# Patient Record
Sex: FEMALE | Race: WHITE | NOT HISPANIC OR LATINO | URBAN - METROPOLITAN AREA
[De-identification: names, ages, dates, MRNs, and addresses within clinical notes are randomized per-mention and may not be internally consistent; named-entity substitution may affect disease eponyms.]

---

## 2017-08-24 ENCOUNTER — EMERGENCY (EMERGENCY)
Facility: HOSPITAL | Age: 27
LOS: 1 days | Discharge: HOME | End: 2017-08-24
Attending: EMERGENCY MEDICINE | Admitting: EMERGENCY MEDICINE
Payer: COMMERCIAL

## 2017-08-24 VITALS
DIASTOLIC BLOOD PRESSURE: 80 MMHG | OXYGEN SATURATION: 99 % | HEART RATE: 70 BPM | TEMPERATURE: 98 F | RESPIRATION RATE: 16 BRPM | SYSTOLIC BLOOD PRESSURE: 119 MMHG | HEIGHT: 61 IN | WEIGHT: 113.98 LBS

## 2017-08-24 LAB — HCG UR QL: NEGATIVE — SIGNIFICANT CHANGE UP

## 2017-08-24 PROCEDURE — 99283 EMERGENCY DEPT VISIT LOW MDM: CPT

## 2017-08-24 PROCEDURE — 81025 URINE PREGNANCY TEST: CPT

## 2017-08-24 NOTE — ED STATDOCS - CARE PLAN
Principal Discharge DX:	Needle stick injury of finger of left hand, initial encounter  Instructions for follow-up, activity and diet:	Follow-up with employee health today

## 2017-08-24 NOTE — ED STATDOCS - PROGRESS NOTE DETAILS
NP NOTE: Pt seen by intake physician and HPI/ROS/PE/MDM reviewed. Pt seen and evaluated. Discussed plan and any resulted studies at this time. Will continue to monitor and re-evaluate.  Re-Evaluation: needle stick packet completed. pending source patient results. low risk stick, pt deciding to wait for results PEP. Source patient testing in progress.  Lengthy d/w patient regarding PEP.  Low risk exposure: IO needle, into finger, patient with no historical risk factors for HIV.

## 2017-08-24 NOTE — ED STATDOCS - SKIN, MLM
Barely perceivable puncture to radial aspect of second middle phalanx on right index finger Barely perceivable puncture to radial aspect of second middle phalanx on left index finger

## 2017-08-24 NOTE — ED STATDOCS - ATTENDING CONTRIBUTION TO CARE
I, Jimenez Jeffrey, performed the initial face to face bedside interview with this patient regarding history of present illness, review of symptoms and relevant past medical, social and family history.  I completed an independent physical examination.  I was the provider who initially evaluated this patient.  The history, relevant review of systems, past medical and surgical history, medical decision making, and physical examination was documented by the scribe in my presence and I attest to the accuracy of the documentation. Follow-up on ordered tests (ie labs, radiologic studies) and re-evaluation of the patient's status has been communicated to the ACP.  Disposition of the patient will be based on test outcome and response to ED interventions.

## 2017-08-24 NOTE — ED STATDOCS - OBJECTIVE STATEMENT
26 y/o F pt presents to ED c/o needle stick to right index finger from ED patient PTA. Pt was wearing gloves. Pt denies fever, chills, CP, SOB, nausea, vomiting, diarrhea, abd pain, dysuria, hematuria, urinary frequency/urgency/hesitancy, back pain, headache, and dizziness. No further complaints at this time. NKDA. 26 y/o F pt presents to ED c/o needle stick to left index finger from ED patient PTA. Pt was wearing gloves at time of injury.  patient's imm UTD.  Source patient known: no high risk factors for HVI/ HEP.   No further complaints at this time. NKDA.

## 2017-08-24 NOTE — ED ADULT NURSE NOTE - OBJECTIVE STATEMENT
pt alert and awake x3, employee, states she was removing an IO needle from another pt and stuck herself with needle point, pt states it was left index finger, no obvious signs of bleeding, denies chest pain/sob, LS clear, will continue to monitor.

## 2019-06-13 PROBLEM — Z00.00 ENCOUNTER FOR PREVENTIVE HEALTH EXAMINATION: Status: ACTIVE | Noted: 2019-06-13

## 2019-06-18 ENCOUNTER — APPOINTMENT (OUTPATIENT)
Dept: ORTHOPEDIC SURGERY | Facility: CLINIC | Age: 29
End: 2019-06-18
Payer: COMMERCIAL

## 2019-06-18 VITALS
HEIGHT: 61 IN | SYSTOLIC BLOOD PRESSURE: 133 MMHG | HEART RATE: 75 BPM | BODY MASS INDEX: 22.66 KG/M2 | WEIGHT: 120 LBS | DIASTOLIC BLOOD PRESSURE: 83 MMHG

## 2019-06-18 DIAGNOSIS — M47.816 SPONDYLOSIS W/OUT MYELOPATHY OR RADICULOPATHY, LUMBAR REGION: ICD-10-CM

## 2019-06-18 DIAGNOSIS — Z78.9 OTHER SPECIFIED HEALTH STATUS: ICD-10-CM

## 2019-06-18 DIAGNOSIS — Z80.1 FAMILY HISTORY OF MALIGNANT NEOPLASM OF TRACHEA, BRONCHUS AND LUNG: ICD-10-CM

## 2019-06-18 DIAGNOSIS — Z80.0 FAMILY HISTORY OF MALIGNANT NEOPLASM OF DIGESTIVE ORGANS: ICD-10-CM

## 2019-06-18 PROCEDURE — 99204 OFFICE O/P NEW MOD 45 MIN: CPT

## 2019-06-18 PROCEDURE — 72100 X-RAY EXAM L-S SPINE 2/3 VWS: CPT

## 2019-06-18 RX ORDER — METHYLPREDNISOLONE 4 MG/1
4 TABLET ORAL
Qty: 1 | Refills: 0 | Status: ACTIVE | COMMUNITY
Start: 2019-06-18 | End: 1900-01-01

## 2019-06-18 RX ORDER — KETOROLAC TROMETHAMINE 10 MG/1
10 TABLET, FILM COATED ORAL 3 TIMES DAILY
Qty: 15 | Refills: 0 | Status: ACTIVE | COMMUNITY
Start: 2019-06-18 | End: 1900-01-01

## 2019-06-18 NOTE — PROCEDURE
[de-identified] : Verbal consent was obtained and all questions, comments and concerns were addressed. Right arm was cleansed with alcohol prep X 3, ethyl chloride was used as local anesthetic. Under sterile precautions 30mg of Ketorolac and 80mg Depo-Medrol were instilled in to the right buttock under sterile precautions. she tolerated procedure well. Dry sterile dressing was placed and patient described relief of pain 5 minutes after procedure was performed.

## 2019-06-18 NOTE — DISCUSSION/SUMMARY
[de-identified] : I have recommended that the pt continue with a conservative treatment plan. Pt tolerated the injection given in office today. I think a large portion of this patient's low back pain is due to her  baby / increase in activity. Pt will be referred to pain management for epidural injections. We also spoke about the benefits of using heat, ice, and Bengay cream. I will prescribe a Medrol Dose Pack and oral Toradol to provide pain relief. Pt can use anti-inflammatories PRN pain. Pt was instructed to start home exercises, core strengthening and a sheet was provided. We also spoke about the benefits to chiropractic and medical massage. The pt may follow up here in 2 months for repeat clinical assessment.

## 2019-06-18 NOTE — PHYSICAL EXAM
[Acute Distress] : not in acute distress [Poor Appearance] : well-appearing [de-identified] : X-ray of the lumbar spine taken today shows pars defect L5-S1.\par \par MRI from 6/16/2017 taken at [facility] shows DDD with a non-displaced pars defect at L5. [de-identified] : CONSTITUTIONAL: The patient is a very pleasant individual who is well-nourished and who appears stated age.\par PSYCHIATRIC: The patient is alert and oriented X 3 and in no apparent distress, and participates with orthopedic evaluation well.\par HEAD: Atraumatic and is nonsyndromic in appearance.\par EENT: No visible thyromegaly, EOMI.\par RESPIRATORY: Respiratory rate is regular, not dyspneic on examination.\par LYMPHATICS: There is no inguinal lymphadenopathy\par INTEGUMENTARY: Skin is clean, dry, and intact about the bilateral lower extremities and lumbar spine.\par VASCULAR: There is brisk capillary refill about the bilateral lower extremities.\par NEUROLOGIC: There are no pathologic reflexes. There is no decrease in sensation of the bilateral lower extremities on Wartenberg pinwheel examination. Deep tendon reflexes are well maintained at 2+/4 of the bilateral lower extremities and are symmetric.\par MUSCULOSKELETAL: There is no visible muscular atrophy. Manual motor strength is well maintained in the bilateral lower extremities. Range of motion of lumbar spine is well maintained. The patient ambulates in a non-myelopathic manner. Negative tension sign and straight leg raise bilaterally. Quad extension, ankle dorsiflexion, EHL, plantar flexion, and ankle eversion are well preserved. Normal secondary orthopaedic exam of bilateral hips, greater trochanteric area, knees and ankles \par \par Mechanically orientated low back pain / myositis

## 2019-06-18 NOTE — ADDENDUM
[FreeTextEntry1] : Documented by Jagdish Gonzalez acting as a scribe for Dr. Anant Saunders on 06/18/2019 . All medical record entries made by the Scribe were at my, Dr. Anant Saunders, direction and personally dictated by me on 06/18/2019 . I have reviewed the chart and agree that the record accurately reflects my personal performance of the history, physical exam, assessment and plan. I have also personally directed, reviewed, and agreed with the chart.

## 2019-06-18 NOTE — REVIEW OF SYSTEMS
[Joint Pain] : joint pain [Negative] : Heme/Lymph [Chills] : no chills [Fever] : no fever [SOB on Exertion] : no shortness of breath on exertion [Cough] : no cough

## 2019-06-18 NOTE — HISTORY OF PRESENT ILLNESS
[de-identified] : 29 year old F presents with lumbar spine pain. 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 Started in 2015, had an MRI, pain was tolerable. She was given medications. Did not do physical therapy. She had a baby. For the past 6 months her pain has been bad. Exacerbated by bending. Robaxin and Flexeril do not provide relief. [Incontinence] : no incontinence [Ataxia] : no ataxia [Loss of Dexterity] : good dexterity [Urinary Ret.] : no urinary retention

## 2019-06-19 ENCOUNTER — TRANSCRIPTION ENCOUNTER (OUTPATIENT)
Age: 29
End: 2019-06-19

## 2019-06-19 DIAGNOSIS — M43.00 SPONDYLOLYSIS, SITE UNSPECIFIED: ICD-10-CM

## 2019-06-19 DIAGNOSIS — Z78.9 OTHER SPECIFIED HEALTH STATUS: ICD-10-CM

## 2019-06-19 DIAGNOSIS — M43.10 SPONDYLOLYSIS, SITE UNSPECIFIED: ICD-10-CM

## 2019-06-25 PROBLEM — Z78.9 NEVER EXERCISES: Status: ACTIVE | Noted: 2019-06-18

## 2019-06-25 PROBLEM — M43.00 PARS DEFECT WITH SPONDYLOLISTHESIS: Status: ACTIVE | Noted: 2019-06-25

## 2019-06-25 PROBLEM — Z78.9 NON-SMOKER: Status: ACTIVE | Noted: 2019-06-18

## 2019-06-25 PROBLEM — Z78.9 DOES NOT USE ILLICIT DRUGS: Status: ACTIVE | Noted: 2019-06-18

## 2019-06-25 RX ORDER — CYCLOBENZAPRINE HCL 10 MG
10 TABLET ORAL
Refills: 0 | Status: ACTIVE | COMMUNITY

## 2019-06-25 RX ORDER — METHOCARBAMOL 750 MG/1
750 TABLET, FILM COATED ORAL
Refills: 0 | Status: ACTIVE | COMMUNITY

## 2019-07-18 ENCOUNTER — APPOINTMENT (OUTPATIENT)
Dept: ORTHOPEDIC SURGERY | Facility: CLINIC | Age: 29
End: 2019-07-18

## 2020-04-26 ENCOUNTER — MESSAGE (OUTPATIENT)
Age: 30
End: 2020-04-26

## 2020-05-04 ENCOUNTER — APPOINTMENT (OUTPATIENT)
Dept: DISASTER EMERGENCY | Facility: HOSPITAL | Age: 30
End: 2020-05-04

## 2020-05-05 LAB
SARS-COV-2 IGG SERPL IA-ACNC: <0.1 INDEX
SARS-COV-2 IGG SERPL QL IA: NEGATIVE

## 2020-09-04 ENCOUNTER — APPOINTMENT (OUTPATIENT)
Dept: ORTHOPEDIC SURGERY | Facility: CLINIC | Age: 30
End: 2020-09-04

## 2025-03-26 NOTE — ED ADULT NURSE NOTE - PAIN RATING/NUMBER SCALE (0-10): REST
\"0